# Patient Record
Sex: FEMALE | Race: BLACK OR AFRICAN AMERICAN | NOT HISPANIC OR LATINO | URBAN - METROPOLITAN AREA
[De-identification: names, ages, dates, MRNs, and addresses within clinical notes are randomized per-mention and may not be internally consistent; named-entity substitution may affect disease eponyms.]

---

## 2018-08-13 ENCOUNTER — OFFICE VISIT (OUTPATIENT)
Dept: OBSTETRICS AND GYNECOLOGY | Facility: CLINIC | Age: 58
End: 2018-08-13
Payer: COMMERCIAL

## 2018-08-13 VITALS
HEIGHT: 66 IN | BODY MASS INDEX: 28.63 KG/M2 | DIASTOLIC BLOOD PRESSURE: 68 MMHG | WEIGHT: 178.13 LBS | SYSTOLIC BLOOD PRESSURE: 110 MMHG

## 2018-08-13 DIAGNOSIS — Z01.419 WELL WOMAN EXAM WITH ROUTINE GYNECOLOGICAL EXAM: Primary | ICD-10-CM

## 2018-08-13 PROCEDURE — 99999 PR PBB SHADOW E&M-EST. PATIENT-LVL III: CPT | Mod: PBBFAC,,, | Performed by: OBSTETRICS & GYNECOLOGY

## 2018-08-13 PROCEDURE — 99386 PREV VISIT NEW AGE 40-64: CPT | Mod: S$GLB,,, | Performed by: OBSTETRICS & GYNECOLOGY

## 2018-08-13 RX ORDER — LISINOPRIL AND HYDROCHLOROTHIAZIDE 10; 12.5 MG/1; MG/1
TABLET ORAL
Refills: 2 | COMMUNITY
Start: 2018-07-04

## 2018-08-13 RX ORDER — METFORMIN HYDROCHLORIDE 500 MG/1
500 TABLET, EXTENDED RELEASE ORAL 2 TIMES DAILY
Refills: 1 | COMMUNITY
Start: 2018-06-10

## 2018-08-13 RX ORDER — ALBUTEROL SULFATE 90 UG/1
AEROSOL, METERED RESPIRATORY (INHALATION)
Refills: 3 | COMMUNITY
Start: 2018-07-10

## 2018-08-13 RX ORDER — SERTRALINE HYDROCHLORIDE 25 MG/1
TABLET, FILM COATED ORAL
Refills: 1 | COMMUNITY
Start: 2018-07-04

## 2018-08-13 NOTE — PROGRESS NOTES
Subjective:       Patient ID: Charline Dominguez is a 57 y.o. female.    Chief Complaint:  Annual Exam      History of Present Illness  HPI  Annual Exam-Postmenopausal  Patient presents for annual exam. The patient has no complaints today. The patient is not sexually active. GYN screening history: last pap: was normal and last mammogram: approximate date 2018 and was normal. The patient is not taking hormone replacement therapy. Patient denies post-menopausal vaginal bleeding. The patient wears seatbelts: yes. The patient participates in regular exercise: no. Has the patient ever been transfused or tattooed?: no. The patient reports that there is not domestic violence in her life.  Pt had total hysterectomy with BSO for benign indications 16 yrs ago.  Denies history of Gyn malignancy or dysplasia.       GYN & OB History  No LMP recorded. Patient has had a hysterectomy.   Date of Last Pap: No result found    OB History    Para Term  AB Living   4 4 4     4   SAB TAB Ectopic Multiple Live Births           4      # Outcome Date GA Lbr Priyank/2nd Weight Sex Delivery Anes PTL Lv   4 Term      Vag-Spont   MAYRA   3 Term      Vag-Spont   MAYRA   2 Term      Vag-Spont   MAYRA   1 Term      Vag-Spont   MAYRA          Review of Systems  Review of Systems   Constitutional: Negative for activity change, appetite change, fatigue, fever and unexpected weight change.   Respiratory: Negative for shortness of breath.    Cardiovascular: Negative for chest pain, palpitations and leg swelling.   Gastrointestinal: Negative for abdominal pain, bloating, blood in stool, constipation, diarrhea, nausea and vomiting.   Genitourinary: Negative for dysuria, flank pain, frequency, genital sores, hematuria, pelvic pain, urgency, vaginal bleeding, vaginal discharge, vaginal pain, urinary incontinence and vaginal odor.   Musculoskeletal: Negative for back pain.   Neurological: Negative for syncope and headaches.   Breast: Negative for breast mass,  breast pain, nipple discharge and skin changes          Objective:    Physical Exam:   Constitutional: She is oriented to person, place, and time. She appears well-developed and well-nourished. No distress.    HENT:   Head: Normocephalic and atraumatic.    Eyes: EOM are normal. Pupils are equal, round, and reactive to light.    Neck: Normal range of motion. Neck supple.    Cardiovascular: Normal rate, regular rhythm and normal heart sounds.     Pulmonary/Chest: Effort normal and breath sounds normal. Right breast exhibits no inverted nipple, no mass, no nipple discharge, no skin change, no tenderness, no bleeding and no swelling. Left breast exhibits no inverted nipple, no mass, no nipple discharge, no skin change, no tenderness, no bleeding and no swelling. Breasts are symmetrical.        Abdominal: Soft. Bowel sounds are normal. She exhibits no distension. There is no tenderness.     Genitourinary: Vagina normal. Pelvic exam was performed with patient supine. There is no rash, tenderness, lesion or injury on the right labia. There is no rash, tenderness, lesion or injury on the left labia. Uterus is absent. Right adnexum displays no mass, no tenderness and no fullness. Left adnexum displays no mass, no tenderness and no fullness. No erythema, tenderness or bleeding in the vagina. No foreign body in the vagina. No signs of injury around the vagina. No vaginal discharge found. Vaginal cuff normal.Cervix exhibits absence.           Musculoskeletal: Normal range of motion and moves all extremeties. She exhibits no edema or tenderness.       Neurological: She is alert and oriented to person, place, and time.    Skin: Skin is warm and dry.    Psychiatric: She has a normal mood and affect. Her behavior is normal. Thought content normal.          Assessment:        1. Well woman exam with routine gynecological exam             Plan:      Well woman exam with routine gynecological exam  -     Pt was counseled on  cervical/vaginal screening guidelines and recommendations.  As per current recommendations, pt no longer requires routine pap screening or routine screening pelvic examinations.  If pt still desires screening Gyn exams, would recommend minimum 2 year interval.  Pt requests pelvic exam today.  Pt may follow with PCP for routine health maintenance needs.  -     Pt was advised on current breast cancer screening recommendations.  Pt requests to proceed with breast exam today and screening MMG is up to date.      Follow-up in about 2 years (around 8/13/2020).

## 2018-08-27 ENCOUNTER — OFFICE VISIT (OUTPATIENT)
Dept: PODIATRY | Facility: CLINIC | Age: 58
End: 2018-08-27
Payer: COMMERCIAL

## 2018-08-27 ENCOUNTER — HOSPITAL ENCOUNTER (OUTPATIENT)
Dept: RADIOLOGY | Facility: HOSPITAL | Age: 58
Discharge: HOME | End: 2018-08-27
Attending: PODIATRIST
Payer: COMMERCIAL

## 2018-08-27 ENCOUNTER — TELEPHONE (OUTPATIENT)
Dept: PODIATRY | Facility: CLINIC | Age: 58
End: 2018-08-27

## 2018-08-27 VITALS
HEART RATE: 54 BPM | SYSTOLIC BLOOD PRESSURE: 129 MMHG | DIASTOLIC BLOOD PRESSURE: 70 MMHG | HEIGHT: 66 IN | BODY MASS INDEX: 29.44 KG/M2 | WEIGHT: 183.19 LBS

## 2018-08-27 DIAGNOSIS — M77.52 BONE SPUR OF LEFT FOOT: ICD-10-CM

## 2018-08-27 DIAGNOSIS — M21.42 ACQUIRED PES PLANUS OF LEFT FOOT: ICD-10-CM

## 2018-08-27 DIAGNOSIS — M21.41 ACQUIRED PES PLANUS, RIGHT: ICD-10-CM

## 2018-08-27 DIAGNOSIS — L84 CORN OR CALLUS: Primary | ICD-10-CM

## 2018-08-27 DIAGNOSIS — M79.672 LEFT FOOT PAIN: ICD-10-CM

## 2018-08-27 DIAGNOSIS — L85.3 XEROSIS OF SKIN: ICD-10-CM

## 2018-08-27 PROCEDURE — 73630 X-RAY EXAM OF FOOT: CPT | Mod: 26,LT,, | Performed by: RADIOLOGY

## 2018-08-27 PROCEDURE — 99203 OFFICE O/P NEW LOW 30 MIN: CPT | Mod: S$GLB,,, | Performed by: PODIATRIST

## 2018-08-27 PROCEDURE — 99999 PR PBB SHADOW E&M-EST. PATIENT-LVL III: CPT | Mod: PBBFAC,,, | Performed by: PODIATRIST

## 2018-08-27 PROCEDURE — 3008F BODY MASS INDEX DOCD: CPT | Mod: CPTII,S$GLB,, | Performed by: PODIATRIST

## 2018-08-27 PROCEDURE — 73630 X-RAY EXAM OF FOOT: CPT | Mod: TC,FY,PO,LT

## 2018-08-27 NOTE — PROGRESS NOTES
[facility]  PODIATRIC MEDICINE AND SURGERY      CHIEF COMPLAINT  Chief Complaint   Patient presents with    Callouses     PCP Dr. GONZALEZ Rico 05/08 Pt states that she has some corn on her left hallux, callouses on the balls of her feet. She also states that she has a bump on the top of her left foot, poss bone spur, rates pain 9/10 with closed toe shoes    Foot Problem     She also states that she feet have been peeling but does not itch         HPI    SUBJECTIVE: Charilne Dominguez is a 57 y.o. female who  has a past medical history of Diabetes mellitus (2016) and Hypertension (2016). Lindapresents to clinic for high risk diabetic foot exam and care.  Charline  presents with complaints of calluses and corns on feet.  She relates that left 5th toe corn has been particularly painful depending on which shoe she wears.  She also relates she has diffuse calluses on bottom of both feet.  Symptoms are aggravated in certain shoe wear. She also complains about a bone spur possibly on her left foot that has been tender with enclosed shoes.  She rates pain 9/10 on pain scale.  Symptoms have been present for several weeks.  She has not had any treatment for symptoms.  She has no further pedal complaints at this time.      HgA1c: No results found for: HGBA1C      PMH  Past Medical History:   Diagnosis Date    Diabetes mellitus 2016    Hypertension 2016       MEDS  Current Outpatient Medications on File Prior to Visit   Medication Sig Dispense Refill    lisinopril-hydrochlorothiazide (PRINZIDE,ZESTORETIC) 10-12.5 mg per tablet 1 TABLET ONCE A DAY ORALLY 90 DAYS  2    metFORMIN (GLUCOPHAGE-XR) 500 MG 24 hr tablet Take 500 mg by mouth 2 (two) times daily.  1    PROAIR HFA 90 mcg/actuation inhaler 2 PUFFS AS NEEDED TWICE A DAY INHALATION 30 DAYS  3    sertraline (ZOLOFT) 25 MG tablet 1 TABLET ONCE A DAY ORALLY 90 DAYS  1     No current facility-administered medications on file prior to visit.        PSH     Past  "Surgical History:   Procedure Laterality Date    cyst removal in breast      HYSTERECTOMY      TUBAL LIGATION  08/2002        ALL  Review of patient's allergies indicates:  No Known Allergies    SOC     Social History     Tobacco Use    Smoking status: Never Smoker    Smokeless tobacco: Never Used   Substance Use Topics    Alcohol use: Yes     Comment: wine occasionally    Drug use: No         Family HX    Family History   Problem Relation Age of Onset    Pancreatic cancer Mother     Stroke Brother             REVIEW OF SYSTEMS  General: Denies any fever or chills  Chest: Denies shortness of breath, wheezing, coughing, or sputum production  Heart: Denies chest pain.  As noted above and per history of current illness above, otherwise negative in the remainder of the 14 systems.     PHYSICAL EXAM  Vitals:    08/27/18 1533   BP: 129/70   Pulse: (!) 54   Weight: 83.1 kg (183 lb 3.2 oz)   Height: 5' 6" (1.676 m)       GEN:  This patient is well-developed, well-nourished and appears stated age, well-oriented to person, place and time, and cooperative and pleasant on today's visit.      LOWER EXTREMITY PHYSICAL EXAMINATION   VASCULAR  DP pedal pulse 2/4 RIGHT, LEFT2/4   PT pedal pulse 2/4 RIGHT, LEFT2/4  Capillary refill time immediate to the toes.   Feet are warm to the touch. Skin temperature warm to warm from proximally to distally   There are no varicosities, telangiectasias noted to bilateral foot and ankle regions.   There are no ecchymoses noted to bilateral foot and ankle regions.   There is no gross lower extremity edema.    DERMATOLOGIC  Skin moist with healthy texture and turgor.  There are no open ulcerations, lacerations, or fissures to bilateral foot and ankle regions. There are no signs of infection as there is no erythema, no proximal-extending lymphangiitis, no fluctuance, or crepitus noted on palpation to bilateral foot and ankle regions.   There is no interdigital maceration.   There are diffuse " hyperkeratotic lesions noted to feet, plantar bilateral. There is HPK dorsal lateral fifth digit LEFT. Nails are well-trimmed.    NEUROLOGIC  Protective sensation absent at 0/10 sites upon examination with [place] Weinsten 5.07 g monofilament.  Propioception intact at 1st MTPJ b/l.  Achilles and patellar deep tendon reflexes intact  Babinski reflex absent    ORTHOPEDIC/BIOMECHANICAL  Adductovarus rotation fifth digit bilateral  There is prominent midfoot exostosis LEFT foot, negative pain with palpation, there is no palpable soft tissue mass  Muscle strength AT/EHL/EDL/PT: 5/5; Achilles/Gastroc/Soleus: 5/5; PB/PL: 5/5 Muscle tone is normal.  Ankle joint ROM limited DF/PF, non-crepitus  STJ ROM  inv/ev, non crepitus         ASSESSMENT  Encounter Diagnoses   Name Primary?    Corn or callus Yes    Left foot pain     Xerosis of skin     Acquired pes planus of left foot     Acquired pes planus, right     Bone spur of left foot          Plan:  -Initial patient evaluation with H&P was performed  -Discuss presenting problems, etiology, pathologic processes and management options with patient today.   -I counseled the patient on their conditions, their implications and medical management. An in depth discussion on diabetic management, risk prevention, amputation prevention verbally and provided educational literature in written format.  - Shoe inspection. Diabetic Foot Education. Patient reminded of the importance of good nutrition and blood sugar control to help prevent podiatric complications of diabetes. Patient instructed on proper foot hygeine. We discussed wearing proper shoe gear, daily foot inspections, never walking without protective shoe gear, never putting sharp instruments to feet, routine podiatric visits annually/semi annually or sooner if symptoms arise    Reviewed findings and explained condition to the patient with visual reference to the foot. I explained that the toe deformity is usually inherited or  acquired over time. Repetitive pressure along the side of the toe results in a thickened keratotic build up or a corn that can become tender and sensitive. I explained that sometimes surgical intervention involving a soft tissue re-balancing/repair, osseus reconstruction, or a combination of both is the permanent solution, but conservative measures should be attempted first. UREA cream recommendations    In regards to bone spur, topical pain cream prescribed. If pain persists, will perform CS injection. Orthotics and shoe recommendations provided     Disclaimer: This note was partially prepared using a voice recognition system and is likely to have sound alike errors within the text.        Report Electronically Signed By:     Brittnee Marie DPM   Podiatry  [facility]  8/27/2018

## 2018-08-27 NOTE — TELEPHONE ENCOUNTER
Contacted pt but was unable to reach, lvm.      Courtesy call- xray results confirms bone spur/ mild arthritis at her midfoot (area she related pain present) on left foot.

## 2018-08-27 NOTE — PATIENT INSTRUCTIONS
You can purchase UREA 40% cream online     [email or web address]     [employer]Sources   Urea 40% Foot Cream 4 oz - Best Callus Remover - Moisturizes & Rehydrates Thick, Cracked, Rough, Dead & Dry Skin - For Feet, Elbows and Hands + Free Pumice Stone - 100% Money Back Guarantee   4.6 out of 5 stars 1,181   $14.99 Prime    You can also purchase Flexitol heel balm cream. This can be found at [facility] or online [email or web address]